# Patient Record
Sex: MALE | Race: WHITE | Employment: OTHER | URBAN - NONMETROPOLITAN AREA
[De-identification: names, ages, dates, MRNs, and addresses within clinical notes are randomized per-mention and may not be internally consistent; named-entity substitution may affect disease eponyms.]

---

## 2017-11-29 ENCOUNTER — HOSPITAL ENCOUNTER (OUTPATIENT)
Dept: WOUND CARE | Age: 67
Discharge: HOME | End: 2017-11-29
Payer: MEDICARE

## 2017-11-29 VITALS
BODY MASS INDEX: 33.41 KG/M2 | RESPIRATION RATE: 20 BRPM | HEIGHT: 73 IN | HEART RATE: 83 BPM | TEMPERATURE: 97.6 F | WEIGHT: 252.1 LBS | SYSTOLIC BLOOD PRESSURE: 158 MMHG | DIASTOLIC BLOOD PRESSURE: 89 MMHG

## 2017-11-29 DIAGNOSIS — L85.3 XEROSIS OF SKIN: ICD-10-CM

## 2017-11-29 DIAGNOSIS — E11.620 CONTROLLED TYPE 2 DIABETES MELLITUS WITH DIABETIC DERMATITIS, WITH LONG-TERM CURRENT USE OF INSULIN (HCC): ICD-10-CM

## 2017-11-29 DIAGNOSIS — Z79.4 CONTROLLED TYPE 2 DIABETES MELLITUS WITH DIABETIC DERMATITIS, WITH LONG-TERM CURRENT USE OF INSULIN (HCC): ICD-10-CM

## 2017-11-29 PROCEDURE — 99212 OFFICE O/P EST SF 10 MIN: CPT | Performed by: PODIATRIST

## 2017-11-29 PROCEDURE — 99213 OFFICE O/P EST LOW 20 MIN: CPT

## 2017-11-29 RX ORDER — WATER / MINERAL OIL / WHITE PETROLATUM 16 OZ
CREAM TOPICAL
Qty: 1 TUBE | Refills: 5 | Status: SHIPPED | OUTPATIENT
Start: 2017-11-29 | End: 2018-11-29

## 2017-11-29 NOTE — PROGRESS NOTES
 CARPAL TUNNEL RELEASE Bilateral     CATARACT REMOVAL Bilateral     NECK SURGERY      plate    TOE AMPUTATION Left 9/2/15    2nd toe left foot    VEIN SURGERY      vein stripping x 3       FAMILY HISTORY    History reviewed. No pertinent family history. SOCIAL HISTORY    Social History     Social History    Marital status:      Spouse name: N/A    Number of children: N/A    Years of education: N/A     Occupational History    Not on file. Social History Main Topics    Smoking status: Former Smoker    Smokeless tobacco: Not on file    Alcohol use Not on file    Drug use: Unknown    Sexual activity: Not on file     Other Topics Concern    Not on file     Social History Narrative    No narrative on file         REVIEW OF SYSTEMS    Review of Systems:       Negative for: fever/chills, headache, visual changes, chest pain, shortness of breath, nausea/vomiting/diarrhea, bruising, rash, numbness/tingling and weakness,claudication , night or rest pain , DVT                                          Positive for DM LOPS                                                             Hx digital amputation , L2     Multidisciplinary assessment by members of the wound care team is carried out, including vitals and check of review of systems and accepted.     PE:            VSS, Afebrile, NAD, A&O x 3, WD/WN , ambulatory ; bipedal , plantigrade and propulsive           LLE -- plantar foot  ; xerotic cleft with surrounding additional dryness                                                No tracking                                                No erythema                                                No PMT on direct exam                                                Photo documentation                        Foot ; decreased forefoot 2-pt & light touch epicritic                                   +1/4 palpable pedal pulses       Wound Care Documentation   Wound 11/29/17 Other (Comment) Foot Left #1 left foot bottom of left great toe calloused area (Active)   Wound Image   11/29/2017  2:20 PM   Wound Diabetic 11/29/2017  2:20 PM   Wound Cleansed Wound cleanser 11/29/2017  2:20 PM   Wound Length (cm) 0.3 cm 11/29/2017  2:20 PM   Wound Width (cm) 0.1 cm 11/29/2017  2:20 PM   Calculated Wound Size (cm^2) (l*w) 0.03 cm^2 11/29/2017  2:20 PM   Wound Assessment Dry 11/29/2017  2:20 PM   Drainage Amount None 11/29/2017  2:20 PM   Odor None 11/29/2017  2:20 PM   Breana-wound Assessment Calloused;Dry 11/29/2017  2:20 PM   Other%Wound Bed 100 11/29/2017  2:20 PM   Number of days: 0         Debridement no  IMP: DM skin manifestations ; dermatitis w/o infection   REC: diligent specialized alternating skin care     Today's dressing: Other: barrier ointment   Today's orders: see QUINTON Bernal  11/29/2017  4:40 PM

## 2017-11-29 NOTE — PROGRESS NOTES
Blood pressure elevated and rechecked. Patient states he has been taking Nyqil cold medicine and other cold medicine treatment which may have caused the increase. Does have BP cuff at home instructed to monitor and let PCP aware.     Diabetic Quality Measure Benchmarks:    FSBS:  RESULT 100        0800   Hemoglobin A1c Controlled and is < 8%     Yes 7.4 has gone down from 9.5 the last time it was done  LDL is <100                                                  Yes  Blood Pressure is under < 140/90               No  Tobacco Non use                                         Yes  Aspirin Use                                                    Yes    Information sent to PCP                                Yes    Instructions given to Patient regarding follow up and or education      Yes

## 2017-11-29 NOTE — PROGRESS NOTES
Wound Management Medical Nutrition Therapy Follow-Up - New return    Wt Readings from Last 3 Encounters:   11/29/17 252 lb 1.6 oz (114.4 kg)   09/15/15 234 lb (106.1 kg)   09/09/15 234 lb (106.1 kg)     Significant Weight Change- No   Unintentional- Yes     Patient Insulin Aspart, PARoxetine, acetaminophen, aspirin, ezetimibe-simvastatin, insulin NPH, insulin regular, and tamsulosin    Labs - No results found for: LABALBU, GLUCOSE, LABA1C, PREALBUMIN  Other Labs - none    Glucometer readings at home are running mid 100's through day, but reports frequent hypoglycemia at night  . Diabetes Control- adequate    Dietary Recall Reveals- Low carbohydrate use, often with 1-2 choices per meal and night snack as well. Walks after supper, but not active during day otherwise, which may be reducing his insulin needs. Noted weight increases over time. Noted calloused area on foot. Medical Nutrition Therapy Discussed- Encouraged patient to speak with provider about reducing hs insulin, given that he is walking after supper. Nutrition Therapy Recommendations- Please talk with you primary care provider about your low glucose readings in middle of night. You may be able to reduce insulin quantity at night (not eat more) because of exercise you do after supper.        Follow-up- quarterly    Electronically signed by Randolph Zhang RDN, LD 11/29/2017, 2:39 PM      PQRS Measure: #1(Diabetes: Hemoglobin A1C Poor Control) - no   PQRS Measure #130 (Documentation of Current Medications in Medical Record) - yes

## 2017-11-29 NOTE — PLAN OF CARE
Problem: Blood Glucose:  Goal: Ability to maintain appropriate glucose levels will improve  Ability to maintain appropriate glucose levels will improve  Outcome: Ongoing